# Patient Record
Sex: FEMALE | Race: WHITE
[De-identification: names, ages, dates, MRNs, and addresses within clinical notes are randomized per-mention and may not be internally consistent; named-entity substitution may affect disease eponyms.]

---

## 2017-03-22 ENCOUNTER — HOSPITAL ENCOUNTER (EMERGENCY)
Dept: HOSPITAL 63 - ER | Age: 27
Discharge: HOME | End: 2017-03-22
Payer: OTHER GOVERNMENT

## 2017-03-22 VITALS — HEIGHT: 61 IN | WEIGHT: 185 LBS | BODY MASS INDEX: 34.93 KG/M2

## 2017-03-22 VITALS — SYSTOLIC BLOOD PRESSURE: 104 MMHG | DIASTOLIC BLOOD PRESSURE: 57 MMHG

## 2017-03-22 DIAGNOSIS — O99.331: ICD-10-CM

## 2017-03-22 DIAGNOSIS — Z88.8: ICD-10-CM

## 2017-03-22 DIAGNOSIS — R11.2: ICD-10-CM

## 2017-03-22 DIAGNOSIS — F12.10: ICD-10-CM

## 2017-03-22 DIAGNOSIS — O26.891: Primary | ICD-10-CM

## 2017-03-22 DIAGNOSIS — G43.909: ICD-10-CM

## 2017-03-22 DIAGNOSIS — Z3A.10: ICD-10-CM

## 2017-03-22 PROCEDURE — 99284 EMERGENCY DEPT VISIT MOD MDM: CPT

## 2017-03-22 PROCEDURE — 96361 HYDRATE IV INFUSION ADD-ON: CPT

## 2017-03-22 PROCEDURE — 96375 TX/PRO/DX INJ NEW DRUG ADDON: CPT

## 2017-03-22 PROCEDURE — 96374 THER/PROPH/DIAG INJ IV PUSH: CPT

## 2017-03-22 NOTE — ED.ADGEN
Past History


Past Medical History:  Anxiety, Migraines


Past Surgical History:  Other


Smoking:  Cigarettes


Alcohol Use:  None


Drug Use:  Marijuana





Adult General


Chief Complaint


Chief Complaint


migraine





HPI


HPI





Patient is a 26 year old female who presents with migraine headache that 

started yesterday. Patient had associated nausea with vomiting. She is 10 weeks 

pregnant, . She denies any abdominal pain, vaginal discharge or bleeding. 

She is attempted her Maxalt at home for her migraine which did not relieve her 

symptoms. She denies any fevers, reports some sinus congestion, no cough. She 

has a history of migraines, states this does feel typical. Her obstetrician is 

Dr. Mclean





Review of Systems


Review of Systems





Constitutional: Denies fever or chills []


Eyes: Denies change in visual acuity, redness, or eye pain []


HENT: Deniessore throat []


Respiratory: Denies cough or shortness of breath []


Cardiovascular: denies cp


GI: Denies abdominal pain, nausea, vomiting, bloody stools or diarrhea []


: Denies dysuria or hematuria []


Musculoskeletal: Denies back pain or joint pain []


Integument: Denies rash or skin lesions []


Neurologic: Denies focal weakness or sensory changes []





Current Medications


Current Medications





 Current Medications








 Medications


  (Trade)  Dose


 Ordered  Sig/Rubina  Start Time


 Stop Time Status Last Admin


Dose Admin


 


 Diphenhydramine


 HCl 25 mg  25 mg  1X  ONCE  3/22/17 07:45


 3/22/17 07:46 DC 3/22/17 08:00


25 MG


 


 Fentanyl Citrate


  (Fentanyl 2ml


 Vial)  50 mcg  1X  ONCE  3/22/17 08:15


 3/22/17 08:16 DC 3/22/17 08:11


50 MCG


 


 Metoclopramide HCl


  (Reglan)  10 mg  1X  ONCE  3/22/17 07:45


 3/22/17 07:46 DC 3/22/17 07:59


10 MG


 


 Sodium Chloride


  (Iv Sodium


 Chloride 0.9%


 1,000ml)  1,000 ml @ 


 1,000 mls/hr  1X  ONCE  3/22/17 07:45


 3/22/17 08:44 DC 3/22/17 07:59


1,000 MLS/HR











Allergies


Allergies





 Allergies








Coded Allergies Type Severity Reaction Last Updated Verified


 


  prochlorperazine Allergy Intermediate rash 3/22/17 Yes


 


  promethazine Allergy Intermediate rash 3/22/17 Yes











Physical Exam


Physical Exam





Constitutional: Well developed, well nourished, no acute distress, non-toxic 

appearance.photophobia


HENT: Normocephalic, atraumatic, bilateral external ears normal, oropharynx 

moist, no oral exudates, nose normal. []


Eyes: PERRLA, EOMI, conjunctiva normal, no discharge. [] 


Neck: Normal range of motion, no tenderness, supple, no stridor. [] 


Cardiovascular:Heart rate regular with regular rhythm, no murmur []


Lungs & Thorax:  Bilateral breath sounds clear to auscultation []


Abdomen: Bowel sounds normal, soft, no tenderness, no masses, no pulsatile 

masses. [] 


Skin: Warm, dry, no erythema, no rash. [] 


Back: No tenderness, no CVA tenderness. [] 


Extremities: No tenderness, no cyanosis, no clubbing, ROM intact, no edema. [] 


Neurologic: Alert and oriented X 3, normal motor function, normal sensory 

function, no focal deficits noted. CN II-XII intact


Psychologic: Affect normal, judgement normal, mood normal. []





Current Patient Data


Vital Signs





 Vital Signs








  Date Time  Temp Pulse Resp B/P Pulse Ox O2 Delivery O2 Flow Rate FiO2


 


3/22/17 08:48  71 20 104/57 99 Room Air  


 


3/22/17 07:10 97.8       











EKG


EKG


[]





Radiology/Procedures


Radiology/Procedures


[]





Course & Med Decision Making


Course & Med Decision Making


Pertinent Labs and Imaging studies reviewed. (See chart for details)





 said she will get a ride. IV established, IV Benadryl and Reglan given 

along with 1 L of IV fluids. Patient became anxious after she was given the 

medications. She is given 50 g of fentanyl and this almost instantaneously 

made her anxiety resolved. She states her headache resolved and she was 

discharged in improved condition. She was given few Zofran ODT tablets and 

instructed to follow-up with OB doctor.





Final Impression


Final Impression


Migraine headache in pregnancy []


Problems:  





Dragon Disclaimer


Dragon Disclaimer


This electronic medical record was generated, in whole or in part, using a 

voice recognition dictation system.








LESLIE DARBY MD Mar 22, 2017 08:57

## 2017-03-23 ENCOUNTER — HOSPITAL ENCOUNTER (EMERGENCY)
Dept: HOSPITAL 63 - ER | Age: 27
Discharge: HOME | End: 2017-03-23
Payer: OTHER GOVERNMENT

## 2017-03-23 VITALS — BODY MASS INDEX: 28.3 KG/M2 | WEIGHT: 149.91 LBS | HEIGHT: 61 IN

## 2017-03-23 VITALS — DIASTOLIC BLOOD PRESSURE: 52 MMHG | SYSTOLIC BLOOD PRESSURE: 95 MMHG

## 2017-03-23 DIAGNOSIS — G43.909: Primary | ICD-10-CM

## 2017-03-23 DIAGNOSIS — F17.210: ICD-10-CM

## 2017-03-23 DIAGNOSIS — Z88.8: ICD-10-CM

## 2017-03-23 DIAGNOSIS — F41.9: ICD-10-CM

## 2017-03-23 PROCEDURE — 99284 EMERGENCY DEPT VISIT MOD MDM: CPT

## 2017-03-23 PROCEDURE — 96374 THER/PROPH/DIAG INJ IV PUSH: CPT

## 2017-03-23 PROCEDURE — 96361 HYDRATE IV INFUSION ADD-ON: CPT

## 2017-03-23 PROCEDURE — 96376 TX/PRO/DX INJ SAME DRUG ADON: CPT

## 2017-03-23 PROCEDURE — 96375 TX/PRO/DX INJ NEW DRUG ADDON: CPT

## 2017-03-23 RX ADMIN — SODIUM CHLORIDE ONE MLS/HR: 0.9 INJECTION, SOLUTION INTRAVENOUS at 19:06

## 2017-03-23 RX ADMIN — METOCLOPRAMIDE ONE MG: 5 INJECTION, SOLUTION INTRAMUSCULAR; INTRAVENOUS at 19:09

## 2017-03-23 RX ADMIN — FENTANYL CITRATE ONE MCG: 50 INJECTION, SOLUTION INTRAMUSCULAR; INTRAVENOUS at 19:08

## 2017-03-23 RX ADMIN — FENTANYL CITRATE ONE MCG: 50 INJECTION, SOLUTION INTRAMUSCULAR; INTRAVENOUS at 20:45

## 2017-03-23 RX ADMIN — SODIUM CHLORIDE ONE MLS/HR: 0.9 INJECTION, SOLUTION INTRAVENOUS at 19:54

## 2017-03-23 RX ADMIN — FENTANYL CITRATE ONE MCG: 50 INJECTION, SOLUTION INTRAMUSCULAR; INTRAVENOUS at 21:21

## 2017-03-23 RX ADMIN — DIPHENHYDRAMINE HYDROCHLORIDE ONE MG: 50 INJECTION INTRAMUSCULAR; INTRAVENOUS at 19:07

## 2017-03-23 NOTE — ED.ADGEN
Past History


Past Medical History:  Anxiety


Past Surgical History:  Other


Smoking:  Cigarettes


Alcohol Use:  None


Drug Use:  None





Adult General


HPI


HPI





Patient is a 26-year-old female presents emergency department complaining of 

headache. She describes she has had previous migraine headaches identical to 

this one. The pain is primarily behind her right high. She was seen and treated 

yesterday for the same complaint. She states that she felt well for a few hours 

but then her pain gradually increased. She denies any other new injury, trauma, 

or illness.





Review of Systems


Review of Systems





Constitutional: Denies fever or chills []


Eyes: Denies change in visual acuity, redness, or eye pain []


HENT: Denies nasal congestion or sore throat []


Respiratory: Denies cough or shortness of breath []


Cardiovascular: No additional information not addressed in HPI []


GI: Denies abdominal pain, nausea, vomiting, bloody stools or diarrhea []


: Denies dysuria or hematuria []


Musculoskeletal: Denies back pain or joint pain []


Integument: Denies rash or skin lesions []


Neurologic: Denies headache, focal weakness or sensory changes []


Endocrine: Denies polyuria or polydipsia []





Current Medications


Current Medications





 Current Medications








 Medications


  (Trade)  Dose


 Ordered  Sig/Rubina  Start Time


 Stop Time Status Last Admin


Dose Admin


 


 Diphenhydramine


 HCl


  (Benadryl)  25 mg  1X  ONCE  3/23/17 18:30


 3/23/17 18:31 DC 3/23/17 19:07


25 MG


 


 Fentanyl Citrate


  (Fentanyl 2ml


 Vial)  25 mcg  1X  ONCE  3/23/17 21:30


 3/23/17 21:31 DC 3/23/17 21:21


25 MCG


 


 Fentanyl Citrate


 50 mcg  50 mcg  1X  ONCE  3/23/17 18:45


 3/23/17 18:46 DC 3/23/17 19:08


50 MCG


 


 Metoclopramide HCl


  (Reglan)  10 mg  1X  ONCE  3/23/17 18:45


 3/23/17 18:46 DC 3/23/17 19:09


10 MG


 


 Sodium Chloride


  (Iv Sodium


 Chloride 0.9%


 1,000ml)  1,000 ml @ 


 1,000 mls/hr  1X  ONCE  3/23/17 20:00


 3/23/17 20:59 DC 3/23/17 19:54


1,000 MLS/HR











Allergies


Allergies





 Allergies








Coded Allergies Type Severity Reaction Last Updated Verified


 


  metoclopramide Allergy Intermediate anxiety 3/22/17 Yes


 


  prochlorperazine Allergy Intermediate rash 3/22/17 Yes


 


  promethazine Allergy Intermediate rash 3/22/17 Yes











Physical Exam


Physical Exam





Constitutional: Well developed, well nourished, no acute distress, non-toxic 

appearance. []


HENT: Normocephalic, atraumatic, bilateral external ears normal, oropharynx 

moist, no oral exudates, nose normal. []


Eyes: PERRLA, EOMI, conjunctiva normal, no discharge. [] 


Neck: Normal range of motion, no tenderness, supple, no stridor. [] 


Cardiovascular:Heart rate regular rhythm, no murmur []


Lungs & Thorax:  Bilateral breath sounds clear to auscultation []


Abdomen: Bowel sounds normal, soft, no tenderness, no masses, no pulsatile 

masses. [] 


Skin: Warm, dry, no erythema, no rash. [] 


Back: No tenderness, no CVA tenderness. [] 


Extremities: No tenderness, no cyanosis, no clubbing, ROM intact, no edema. [] 


Neurologic: Alert and oriented X 3, normal motor function, normal sensory 

function, no focal deficits noted. []


Psychologic: Affect normal, judgement normal, mood normal. []





Current Patient Data


Vital Signs





 Vital Signs








  Date Time  Temp Pulse Resp B/P Pulse Ox O2 Delivery O2 Flow Rate FiO2


 


3/23/17 21:21  70 20 95/52 97 Room Air  


 


3/23/17 17:38 97.9       











EKG


EKG


[]





Radiology/Procedures


Radiology/Procedures


[]





Course & Med Decision Making


Course & Med Decision Making


Pertinent Labs and Imaging studies reviewed. (See chart for details)


Again the patient states that her symptoms have resolved after IV fluids, Reglan

, oxygen, Benadryl, and fentanyl. Patient was urged to follow-up with her OB/

GYN or her primary care physician for these headaches. She will of course 

return emergency department sooner she develops new or worsening symptoms.


[]





Final Impression


Final Impression


Migraine headache []


Problems:  





Dragon Disclaimer


Dragon Disclaimer


This electronic medical record was generated, in whole or in part, using a 

voice recognition dictation system.








JENELLE FROST MD Mar 23, 2017 22:15

## 2017-04-07 ENCOUNTER — HOSPITAL ENCOUNTER (EMERGENCY)
Dept: HOSPITAL 63 - ER | Age: 27
Discharge: HOME | End: 2017-04-07
Payer: OTHER GOVERNMENT

## 2017-04-07 VITALS
SYSTOLIC BLOOD PRESSURE: 106 MMHG | DIASTOLIC BLOOD PRESSURE: 57 MMHG | DIASTOLIC BLOOD PRESSURE: 57 MMHG | DIASTOLIC BLOOD PRESSURE: 57 MMHG | SYSTOLIC BLOOD PRESSURE: 106 MMHG | SYSTOLIC BLOOD PRESSURE: 106 MMHG

## 2017-04-07 VITALS — HEIGHT: 61 IN | WEIGHT: 149.91 LBS | BODY MASS INDEX: 28.3 KG/M2

## 2017-04-07 DIAGNOSIS — O99.331: ICD-10-CM

## 2017-04-07 DIAGNOSIS — F41.9: ICD-10-CM

## 2017-04-07 DIAGNOSIS — O26.891: Primary | ICD-10-CM

## 2017-04-07 DIAGNOSIS — Z88.8: ICD-10-CM

## 2017-04-07 DIAGNOSIS — Z3A.12: ICD-10-CM

## 2017-04-07 DIAGNOSIS — G43.909: ICD-10-CM

## 2017-04-07 PROCEDURE — 99284 EMERGENCY DEPT VISIT MOD MDM: CPT

## 2017-04-07 PROCEDURE — 96372 THER/PROPH/DIAG INJ SC/IM: CPT

## 2017-04-07 NOTE — ED.ADGEN
Past History


Past Medical History:  Anxiety, Migraines, Other


Past Surgical History:  Other


Smoking:  Cigarettes


Alcohol Use:  None


Drug Use:  None





Adult General


Chief Complaint


Chief Complaint


".. I am having my migraine again... Dr. Tatum sent me to an neurologist...

".... but they said they could not do anything ...yet. "





HPI


HPI





Patient is a 26 year old female who presents with migraine headache.   Follows 

with [facility]. Pt. is approx. 12 week gravid.  Pt. prior workups for her 

migraines have been negative. Last CT exam reportedly negative. Patient denies 

any immunosuppression. Patient denies any travel. No history of trauma. Patient 

normally healthy, Except for her migraines. Patient states when migraines get 

this bad she requires fentanyl with Benadryl for treatment.





Review of Systems


Review of Systems





Constitutional: Denies fever or chills []


Eyes: Denies change in visual acuity, redness, or eye pain []


HENT: Denies nasal congestion or sore throat []


Respiratory: Denies cough or shortness of breath []


Cardiovascular: No additional information not addressed in HPI []


GI: Denies abdominal pain, , vomiting, bloody stools or diarrhea [] complaints 

of nausea


: Denies dysuria or hematuria []


Musculoskeletal: Denies back pain or joint pain []


Integument: Denies rash or skin lesions []


Neurologic: Complaints of headache,.  Denies focal weakness or sensory changes [

]


Endocrine: Denies polyuria or polydipsia []





Family History


Family History


Noncontributory





Current Medications


Current Medications





 Current Medications








 Medications


  (Trade)  Dose


 Ordered  Sig/Rubina  Start Time


 Stop Time Status Last Admin


Dose Admin


 


 Diphenhydramine


 HCl


  (Benadryl)  50 mg  1X  ONCE  4/7/17 20:00


 4/7/17 20:01 DC 4/7/17 20:00


50 MG


 


 Morphine Sulfate


  (Morphine 10mg


 Syringe)  10 mg  1X  ONCE  4/7/17 20:00


 4/7/17 20:01 DC 4/7/17 20:28


10 MG


 


 Ondansetron HCl


  (Zofran Odt)  8 mg  1X  ONCE  4/7/17 20:00


 4/7/17 20:01 DC 4/7/17 20:27


8 MG











Allergies


Allergies





 Allergies








Coded Allergies Type Severity Reaction Last Updated Verified


 


  metoclopramide Allergy Intermediate anxiety 3/22/17 Yes


 


  prochlorperazine Allergy Intermediate rash 3/22/17 Yes


 


  promethazine Allergy Intermediate rash 3/22/17 Yes











Physical Exam


Physical Exam





Constitutional: Moderate distress, non-toxic appearance. []


HENT: Normocephalic, atraumatic, bilateral external ears normal, oropharynx 

moist, no oral exudates, nose normal. []


Eyes: PERRLA, EOMI, conjunctiva normal, no discharge. [] 


Neck: Normal range of motion, no tenderness, supple, no stridor. [] 


Cardiovascular:Heart rate regular rhythm, no murmur []


Lungs & Thorax:  Bilateral breath sounds clear to auscultation []


Abdomen: Bowel sounds normal, soft, no tenderness, no masses, no pulsatile 

masses. [Obese. Gravid.


Skin: Warm, dry, no erythema, no rash. [] 


Back: No tenderness, no CVA tenderness. [] 


Extremities: No tenderness, no cyanosis, no clubbing, ROM intact, no edema. [] 


Neurologic: Alert and oriented X 3, normal motor function, normal sensory 

function, no focal deficits noted. [] DTRs +2 patella and brachial. No drift. 

Right-hand dominant.  equal. Distal vibratory intact. No Romberg.


Psychologic: Affect anxious, judgement normal, mood normal. []





Current Patient Data


Vital Signs





 Vital Signs








  Date Time  Temp Pulse Resp B/P Pulse Ox O2 Delivery O2 Flow Rate FiO2


 


4/7/17 20:28      Room Air  


 


4/7/17 19:20 98.2 85 20  96   











EKG


EKG


[]





Radiology/Procedures


Radiology/Procedures


[]





Course & Med Decision Making


Course & Med Decision Making


Pertinent Labs and Imaging studies reviewed. (See chart for details).  Pt. to 

keep follow up with primary and ob.   May take Zofran 8 mg up 4 x day for 

marked nausea.   Clear fluid diet.    Keep follow up with Neurology.   Tylenol 

for pain.  Marked pain may take Vicoden up 4 x day.  Return if any concerns. 





[]





Final Impression


Final Impression


1. Migraine Headache[]


2. Gravid 12 weeks


3. Anxiety Disorder


Problems:  





Dragon Disclaimer


Dragon Disclaimer


This electronic medical record was generated, in whole or in part, using a 

voice recognition dictation system.








LINDSAY PRINCE MD Apr 7, 2017 19:27

## 2018-05-07 ENCOUNTER — HOSPITAL ENCOUNTER (OUTPATIENT)
Dept: HOSPITAL 63 - RAD | Age: 28
Discharge: HOME | End: 2018-05-07
Attending: NURSE PRACTITIONER
Payer: OTHER GOVERNMENT

## 2018-05-07 DIAGNOSIS — Z87.891: ICD-10-CM

## 2018-05-07 DIAGNOSIS — R05: Primary | ICD-10-CM

## 2018-05-07 PROCEDURE — 71046 X-RAY EXAM CHEST 2 VIEWS: CPT

## 2018-05-08 NOTE — RAD
EXAM: Chest, 2 views.

 

HISTORY: Cough.

 

COMPARISON: None.

 

FINDINGS: Frontal and lateral views of the chest are obtained. There is no

infiltrate, effusion or pneumothorax. The heart is normal in size.

 

IMPRESSION: No acute pulmonary finding.

 

Electronically signed by: Lou Angelo MD (5/8/2018 9:47 AM) [id number]

## 2018-06-12 ENCOUNTER — HOSPITAL ENCOUNTER (OUTPATIENT)
Dept: HOSPITAL 63 - US | Age: 28
Discharge: HOME | End: 2018-06-12
Payer: OTHER GOVERNMENT

## 2018-06-12 DIAGNOSIS — E04.1: Primary | ICD-10-CM

## 2018-06-12 PROCEDURE — 76536 US EXAM OF HEAD AND NECK: CPT

## 2018-06-12 NOTE — RAD
Indication: Dysphagia

 

TECHNIQUE: Grayscale, color Doppler images of the thyroid

 

COMPARISON: None

 

FINDINGS:

The isthmus measures 2 mm in thickness and is within normal limits.

The left thyroid lobe measures 3.5 x 1.1 x 1.0 cm and is normal in 

echogenicity without discrete nodules.

 

The right thyroid lobe measures 3.4 x 1.3 x 1.2 cm with an isoechoic 

nodule measuring 1.5 x 0.9 x 0.7 cm with indistinct margins with mild 

internal vascularity and without coarse calcifications or cystic 

components.

 

IMPRESSION:

Subtle right thyroid lobe nodule without suspicious features. Follow-up 

ultrasound in 6 months recommended.

 

Electronically signed by: Jesus Alberto Escobar DO (6/12/2018 11:52 AM) [facility]

## 2018-06-13 ENCOUNTER — HOSPITAL ENCOUNTER (OUTPATIENT)
Dept: HOSPITAL 61 - KCIC MRI | Age: 28
Discharge: HOME | End: 2018-06-13
Payer: OTHER GOVERNMENT

## 2018-06-13 DIAGNOSIS — R53.1: Primary | ICD-10-CM

## 2018-06-13 PROCEDURE — 70553 MRI BRAIN STEM W/O & W/DYE: CPT

## 2018-06-13 RX ADMIN — GADOBUTROL 1 MMOL: 604.72 INJECTION INTRAVENOUS at 10:12

## 2018-06-29 ENCOUNTER — HOSPITAL ENCOUNTER (OUTPATIENT)
Dept: HOSPITAL 61 - SURG | Age: 28
Discharge: HOME | End: 2018-06-29
Attending: INTERNAL MEDICINE
Payer: OTHER GOVERNMENT

## 2018-06-29 DIAGNOSIS — M79.7: ICD-10-CM

## 2018-06-29 DIAGNOSIS — Z82.49: ICD-10-CM

## 2018-06-29 DIAGNOSIS — K21.9: ICD-10-CM

## 2018-06-29 DIAGNOSIS — F32.9: ICD-10-CM

## 2018-06-29 DIAGNOSIS — K29.80: ICD-10-CM

## 2018-06-29 DIAGNOSIS — D64.9: ICD-10-CM

## 2018-06-29 DIAGNOSIS — K22.2: Primary | ICD-10-CM

## 2018-06-29 DIAGNOSIS — Z79.899: ICD-10-CM

## 2018-06-29 DIAGNOSIS — F41.9: ICD-10-CM

## 2018-06-29 DIAGNOSIS — K64.0: ICD-10-CM

## 2018-06-29 DIAGNOSIS — Z91.040: ICD-10-CM

## 2018-06-29 DIAGNOSIS — F17.210: ICD-10-CM

## 2018-06-29 DIAGNOSIS — Z91.041: ICD-10-CM

## 2018-06-29 LAB
NEG OBC UR: (no result)
POS OBC UR: (no result)
U PREG PATIENT: NEGATIVE

## 2018-06-29 PROCEDURE — 45380 COLONOSCOPY AND BIOPSY: CPT

## 2018-06-29 PROCEDURE — 86038 ANTINUCLEAR ANTIBODIES: CPT

## 2018-06-29 PROCEDURE — 36415 COLL VENOUS BLD VENIPUNCTURE: CPT

## 2018-06-29 PROCEDURE — 45378 DIAGNOSTIC COLONOSCOPY: CPT

## 2018-06-29 PROCEDURE — 81025 URINE PREGNANCY TEST: CPT

## 2018-06-29 PROCEDURE — 88305 TISSUE EXAM BY PATHOLOGIST: CPT

## 2018-06-29 RX ADMIN — SODIUM CHLORIDE, SODIUM LACTATE, POTASSIUM CHLORIDE, AND CALCIUM CHLORIDE 1 MLS/HR: .6; .31; .03; .02 INJECTION, SOLUTION INTRAVENOUS at 07:34
